# Patient Record
Sex: MALE | Race: WHITE
[De-identification: names, ages, dates, MRNs, and addresses within clinical notes are randomized per-mention and may not be internally consistent; named-entity substitution may affect disease eponyms.]

---

## 2018-06-12 ENCOUNTER — HOSPITAL ENCOUNTER (OUTPATIENT)
Dept: HOSPITAL 43 - DL.ENDO | Age: 68
Discharge: HOME | End: 2018-06-12
Attending: INTERNAL MEDICINE
Payer: OTHER GOVERNMENT

## 2018-06-12 DIAGNOSIS — D64.9: ICD-10-CM

## 2018-06-12 DIAGNOSIS — E66.09: ICD-10-CM

## 2018-06-12 DIAGNOSIS — R12: Primary | ICD-10-CM

## 2018-06-12 DIAGNOSIS — R10.12: ICD-10-CM

## 2018-06-12 DIAGNOSIS — K31.7: ICD-10-CM

## 2018-06-12 DIAGNOSIS — K31.89: ICD-10-CM

## 2018-06-12 DIAGNOSIS — E78.5: ICD-10-CM

## 2018-06-12 PROCEDURE — 87077 CULTURE AEROBIC IDENTIFY: CPT

## 2018-06-12 PROCEDURE — 43239 EGD BIOPSY SINGLE/MULTIPLE: CPT

## 2018-06-12 NOTE — OR
DATE:  06/12/2018

 

PROCEDURE:  Esophagogastroduodenoscopy and multiple pinch biopsies.

 

INSTRUMENT USED:  GIF-H180 Olympus video panendoscope.

 

PREMEDICATIONS:  No oral topical anesthesia used.  Fentanyl 100 mcg intravenous,

Versed 2 mg intravenous.

 

The procedure was done under pulse oximetry, BP recording, and cardiac monitor.

 

INDICATION:  The patient with longstanding heartburn on acid suppressants, also

with left-sided upper abdominal pain as well as recently detected anemia,

unexplained.

 

DESCRIPTION OF PROCEDURE:  Esophagogastroduodenoscopy is performed for detection

of any active erosive lesions, Zaidi esophagus and/or malignancy also under

consideration, H. pylori status to be determined, small bowel biopsies to be

obtained for celiac disease if indicated, endoscopic hemostasis therapy if

needed.  The scope was passed with ease.  Adequate visualization of the

esophagus was made from proximal to distal areas.  No upper esophageal lesions

identified.  No distal esophageal stricture.  No uphill or downhill esophageal

varices.  No Estee-Sanchez tear.  No evidence of erosive esophagitis by [place] criteria.  No esophageal polyp or tumor mass identified.  Z-line was

seen at around 40 cm distal to the oral verge, configuration consistent with

grade 1 by ZAP classification.  No proximal gastric varices noted.  Gastric

fundus examination by retroflexion showed couple of diminutive benign-appearing

polyps.  No gastric ulcer, malignant mass, or vascular ectasia identified.

Duodenal bulb showed no ulcer.  Visualized second part of the duodenum was

unremarkable.  Multiple pinch biopsies 4 in number, were taken from different

areas of the second part of the duodenum and also of tissues that obtained from

the duodenal bulb at 9 and 12 o'clock positions and sent for any histopathologic

evidence of celiac disease.  Multiple pinch biopsies were also obtained from the

gastric antrum and proximal body and sent for PyloriTek test for H. pylori and

histopathology.  No bleeding was noted from any of the visualized areas at the

completion of examination.  Photographs were taken of the duodenal bulb, gastric

antrum, fundus, and distal esophagus.

 

IMPRESSION:  Diminutive gastric fundus polyps.

 

The patient tolerated the procedure well.

 

DD:  06/12/2018 06:44:09

DT:  06/12/2018 13:07:16

[facility]

Job #:  [id number]